# Patient Record
Sex: MALE | Race: WHITE
[De-identification: names, ages, dates, MRNs, and addresses within clinical notes are randomized per-mention and may not be internally consistent; named-entity substitution may affect disease eponyms.]

---

## 2019-11-19 ENCOUNTER — HOSPITAL ENCOUNTER (OUTPATIENT)
Dept: HOSPITAL 19 - SDCO | Age: 24
Discharge: HOME | End: 2019-11-19
Attending: SPECIALIST
Payer: COMMERCIAL

## 2019-11-19 VITALS — BODY MASS INDEX: 17.04 KG/M2 | WEIGHT: 121.7 LBS | HEIGHT: 70.98 IN

## 2019-11-19 VITALS — HEART RATE: 102 BPM | DIASTOLIC BLOOD PRESSURE: 76 MMHG | TEMPERATURE: 97.8 F | SYSTOLIC BLOOD PRESSURE: 116 MMHG

## 2019-11-19 VITALS — SYSTOLIC BLOOD PRESSURE: 112 MMHG | HEART RATE: 78 BPM | DIASTOLIC BLOOD PRESSURE: 78 MMHG

## 2019-11-19 VITALS — SYSTOLIC BLOOD PRESSURE: 118 MMHG | DIASTOLIC BLOOD PRESSURE: 73 MMHG | HEART RATE: 70 BPM

## 2019-11-19 VITALS — HEART RATE: 70 BPM | SYSTOLIC BLOOD PRESSURE: 118 MMHG | DIASTOLIC BLOOD PRESSURE: 82 MMHG | TEMPERATURE: 97.6 F

## 2019-11-19 DIAGNOSIS — R53.83: ICD-10-CM

## 2019-11-19 DIAGNOSIS — R19.7: Primary | ICD-10-CM

## 2019-11-19 DIAGNOSIS — Z79.82: ICD-10-CM

## 2019-11-19 DIAGNOSIS — K59.00: ICD-10-CM

## 2019-11-19 NOTE — NUR
The patient arrived back to [facility] from [facility] at this time. The
patient ambulated from the cart to the recliner in his room with the stand by
assistance of two nurses and appeared to tolerate the activity well. The
patient appears drowsy but arouses easily to his name. The patient's
girlfriend is at his bedside. Post procedure vital signs were started at this
time. Call light is within reach. Will continue to monitor the patient.

## 2019-11-19 NOTE — NUR
The patient appears more alert and agrees to try some apple juice at this
time. The patient's vital signs appear stable. Will continue to monitor the
patient.

## 2019-11-19 NOTE — NUR
The patient was escorted out via wheelchair to a private vehicel by SANDEEP Horton.
The patient's belongings and discharge paperwork were sent with him. The
patient's girlfriend is present to drive him home.

## 2019-11-19 NOTE — NUR
Discharge instructions were reviewed with the patient and his girlfriend at
this time. They both verbalized understanding and have no questions for the
nurse at this time. The patient's IV to his right forearm was removed and a
pressure dressing was applied to the site. The nurse instructed the patient to
get dressed and notify the staff when he is ready to be escorted out.

## 2019-11-19 NOTE — NUR
The patient appears to be tolerating the apple juice well and denies wanting
anything further to eat or drink at this time. The patien voices a desire to
be discharged home.